# Patient Record
Sex: FEMALE
[De-identification: names, ages, dates, MRNs, and addresses within clinical notes are randomized per-mention and may not be internally consistent; named-entity substitution may affect disease eponyms.]

---

## 2020-12-28 ENCOUNTER — RESULT REVIEW (OUTPATIENT)
Age: 45
End: 2020-12-28

## 2024-08-15 ENCOUNTER — APPOINTMENT (OUTPATIENT)
Dept: BARIATRICS | Facility: CLINIC | Age: 49
End: 2024-08-15
Payer: COMMERCIAL

## 2024-08-15 VITALS
WEIGHT: 293 LBS | HEART RATE: 84 BPM | SYSTOLIC BLOOD PRESSURE: 128 MMHG | HEIGHT: 67.5 IN | TEMPERATURE: 97.5 F | OXYGEN SATURATION: 96 % | DIASTOLIC BLOOD PRESSURE: 86 MMHG | BODY MASS INDEX: 45.45 KG/M2

## 2024-08-15 DIAGNOSIS — R63.8 OTHER SYMPTOMS AND SIGNS CONCERNING FOOD AND FLUID INTAKE: ICD-10-CM

## 2024-08-15 DIAGNOSIS — Z78.9 OTHER SPECIFIED HEALTH STATUS: ICD-10-CM

## 2024-08-15 DIAGNOSIS — Z86.39 PERSONAL HISTORY OF OTHER ENDOCRINE, NUTRITIONAL AND METABOLIC DISEASE: ICD-10-CM

## 2024-08-15 DIAGNOSIS — R79.9 ABNORMAL FINDING OF BLOOD CHEMISTRY, UNSPECIFIED: ICD-10-CM

## 2024-08-15 DIAGNOSIS — R63.2 POLYPHAGIA: ICD-10-CM

## 2024-08-15 DIAGNOSIS — E46 UNSPECIFIED PROTEIN-CALORIE MALNUTRITION: ICD-10-CM

## 2024-08-15 DIAGNOSIS — Z00.00 ENCOUNTER FOR GENERAL ADULT MEDICAL EXAMINATION W/OUT ABNORMAL FINDINGS: ICD-10-CM

## 2024-08-15 DIAGNOSIS — E66.01 MORBID (SEVERE) OBESITY DUE TO EXCESS CALORIES: ICD-10-CM

## 2024-08-15 DIAGNOSIS — Z82.49 FAMILY HISTORY OF ISCHEMIC HEART DISEASE AND OTHER DISEASES OF THE CIRCULATORY SYSTEM: ICD-10-CM

## 2024-08-15 DIAGNOSIS — E56.9 VITAMIN DEFICIENCY, UNSPECIFIED: ICD-10-CM

## 2024-08-15 DIAGNOSIS — Z01.812 ENCOUNTER FOR PREPROCEDURAL LABORATORY EXAMINATION: ICD-10-CM

## 2024-08-15 DIAGNOSIS — R63.5 ABNORMAL WEIGHT GAIN: ICD-10-CM

## 2024-08-15 DIAGNOSIS — E61.8 DEFICIENCY OF OTHER SPECIFIED NUTRIENT ELEMENTS: ICD-10-CM

## 2024-08-15 DIAGNOSIS — N80.9 ENDOMETRIOSIS, UNSPECIFIED: ICD-10-CM

## 2024-08-15 DIAGNOSIS — R06.83 SNORING: ICD-10-CM

## 2024-08-15 PROCEDURE — 99205 OFFICE O/P NEW HI 60 MIN: CPT

## 2024-08-19 PROBLEM — Z86.39 PERSONAL HISTORY OF OTHER ENDOCRINE, NUTRITIONAL AND METABOLIC DISEASE: Status: ACTIVE | Noted: 2024-08-15

## 2024-08-19 PROBLEM — N80.9 ENDOMETRIOSIS: Status: ACTIVE | Noted: 2024-08-15

## 2024-08-19 PROBLEM — R63.5 EXCESSIVE WEIGHT GAIN: Status: ACTIVE | Noted: 2024-08-15

## 2024-08-19 PROBLEM — E66.01 MORBID OBESITY: Status: ACTIVE | Noted: 2024-08-15

## 2024-08-19 PROBLEM — R63.8 DIFFICULTY MAINTAINING WEIGHT: Status: ACTIVE | Noted: 2024-08-15

## 2024-08-19 PROBLEM — Z78.9 SOCIAL ALCOHOL USE: Status: ACTIVE | Noted: 2024-08-15

## 2024-08-19 PROBLEM — R63.2 CALORIE OVERLOAD: Status: ACTIVE | Noted: 2024-08-15

## 2024-08-19 PROBLEM — Z82.49 FAMILY HISTORY OF CARDIAC DISORDER: Status: ACTIVE | Noted: 2024-08-15

## 2024-08-19 PROBLEM — Z78.9 NON-SMOKER: Status: ACTIVE | Noted: 2024-08-15

## 2024-08-19 PROBLEM — E46 SUBOPTIMAL NUTRITION: Status: ACTIVE | Noted: 2024-08-15

## 2024-08-19 RX ORDER — CALCIUM CARB/VITAMIN D3/VIT K1 500-500-40
TABLET,CHEWABLE ORAL
Refills: 0 | Status: ACTIVE | COMMUNITY

## 2024-08-19 RX ORDER — AA/PROT/LYSINE/METHIO/VIT C/B6 50-12.5 MG
TABLET ORAL
Refills: 0 | Status: ACTIVE | COMMUNITY

## 2024-08-19 RX ORDER — CHOLECALCIFEROL (VITAMIN D3) 10(400)/ML
DROPS ORAL
Refills: 0 | Status: ACTIVE | COMMUNITY

## 2024-08-19 RX ORDER — EUCALYPTOL, MENTHOL, METHYL SALICYLATE, THYMOL .92; .42; .6; .64 MG/ML; MG/ML; MG/ML; MG/ML
MOUTHWASH ORAL
Refills: 0 | Status: ACTIVE | COMMUNITY

## 2024-08-19 RX ORDER — CYANOCOBALAMIN (VITAMIN B-12) 3000MCG/ML
DROPS SUBLINGUAL
Refills: 0 | Status: ACTIVE | COMMUNITY

## 2024-08-19 NOTE — HISTORY OF PRESENT ILLNESS
[de-identified] : TRINI TRACY is a 49-year-old F with a long-standing Hx of obesity who presents today for initial evaluation for weight management options.  Interested in weight loss medications.   Heaviest/current wt 307 lbs/300 lbs, lowest wt 180 lbs. Goal weight: 190 lbs Diets/exercise programs tried in the past: yes (i.e. WW, calorie-counting) Weight loss medications tried in the past: no   PMH and FH of: pancreatitis: no gallstones: no kidney stones: no MEN syndrome: no Medullary thyroid cancer: no Anxiety: no Glaucoma: no Ophthalmological Contraindications: no Currently taking narcotic pain medication(s) or suboxone: no   History of bariatric surgery: no Obesity comorbidities: none Anti-obesity medication: none   Current dietary lifestyle: during the work week pt skips breakfast and lunch Breakfast: skips Lunch: bagel; salad; OR skips Dinner: pasta; steak; pizza Snacks: 1-2x/day: chips, cookies Drinks: water (>64 oz/day), coffee with oat milk (<40 oz/day)   Activity Lifestyle: Sleep: 5-8 hrs (reports snoring) Physical activity/exercise: daily activity Work: school counselor Smoking/EtOH: nonsmoker; occasional EtOH use   Females of Childbearing Age: Plans for future pregnancy: no Form(s) of Contraception: bilateral salpingectomy Currently breastfeeding: no   Last Pap Smear: 2022

## 2024-08-19 NOTE — REVIEW OF SYSTEMS
[Patient Intake Form Reviewed] : Patient intake form was reviewed [Fatigue] : fatigue [Negative] : Allergic/Immunologic [Fever] : no fever [Chills] : no chills [Night Sweats] : no night sweats [Muscle Pain] : no muscle pain [Muscle Weakness] : no muscle weakness [FreeTextEntry9] : joint swelling

## 2024-08-19 NOTE — ASSESSMENT
[FreeTextEntry1] : TRINI TRACY is a 49 year-old F with a long-standing h/o obesity, who presents today for initial evaluation for weight management options.   Had a lengthy discussion with the patient regarding nutrition, exercise, weight loss medications, and bariatric surgery. Discussed how bariatric surgery may offer greater weight loss results with better long-term success. The patient qualifies for and is most interested in weight loss medications.  Patient is not interested in surgery at this time.   Health maintenance and behavioral/nutrition counseling for obesity: An additional 30 minutes of the visit was spent counseling the patient regarding need for aggressive weight loss and behavior modification including nutrition and proper eating habits, including which foods to eat and avoid.   Emphasized the importance of making healthier food choices including fresh fruits and vegetables, lean meats, and protein sources. Recommended front loading calories, incorporating whole grains, and eliminating fast foods. I also discussed the importance of avoiding fried/fatty foods and foods containing high sugar content including juices/shakes/sodas/desserts.   Also encouraged beginning an exercise program and recommended cardiovascular exercises along with strength training to build lean muscle. Made suggestions on different types of exercises to try.   Patient will work on the following:  - Eliminate after dinner snacking (consider drinking water instead; can add fruit like Watermelon, Cucumber,    Lemon to water)  -Increase water consumption; aim for 64 oz water/day, and an additional 2 cups of water for every cup of    caffeinated beverage   -Focus on eating 3 well-balanced meals with lean protein and fiber during the daytime with appropriate portion size  -Frontload calories (i.e. avoid skipping meals)  -Cooking fresh meals rather than take out/processed/ready-made foods  -Incorporating exercise (aim for 150 mins/week with both cardio- and strength-training)   Start GLP-1 agonist based on authorization and labs (ordered). Currently there are no contraindications for the use of a GLP-1 agonist after reviewing the pt's medical history and labs, including personal and family history of thyroid cancer or MEN 2 Syndrome. Possible side effects of GLP-1 agonist were discussed with the patient, including nausea, abdominal pain, reflux, constipation, delayed gastric emptying, gallstones, kidney stones, visual changes, and pancreatitis. Pt verbalizes understanding and wishes to proceed with medical therapy. Instructions for use provided. Pt understands the need for long-term use and understands the risk of weight-gain upon stopping weight loss medications.   Patient educated to call with questions/concerns All questions answered Return to office 3 weeks after starting GLP-1 agonist; call the office right away with any side effects   Discussed with the pt of the warnings of pregnancy while on a GLP-1 agonist:  - instructed pt to avoid planned pregnancy and use contraception   - advised pt to stop GLP-1 agonist immediately if becomes pregnant   Pt verbalized understanding of the above, patient aware that GLP-1's may not be covered by insurance.   Additional time spent before and after visit reviewing chart.

## 2024-08-19 NOTE — HISTORY OF PRESENT ILLNESS
[de-identified] : TRINI TRACY is a 49-year-old F with a long-standing Hx of obesity who presents today for initial evaluation for weight management options.  Interested in weight loss medications.   Heaviest/current wt 307 lbs/300 lbs, lowest wt 180 lbs. Goal weight: 190 lbs Diets/exercise programs tried in the past: yes (i.e. WW, calorie-counting) Weight loss medications tried in the past: no   PMH and FH of: pancreatitis: no gallstones: no kidney stones: no MEN syndrome: no Medullary thyroid cancer: no Anxiety: no Glaucoma: no Ophthalmological Contraindications: no Currently taking narcotic pain medication(s) or suboxone: no   History of bariatric surgery: no Obesity comorbidities: none Anti-obesity medication: none   Current dietary lifestyle: during the work week pt skips breakfast and lunch Breakfast: skips Lunch: bagel; salad; OR skips Dinner: pasta; steak; pizza Snacks: 1-2x/day: chips, cookies Drinks: water (>64 oz/day), coffee with oat milk (<40 oz/day)   Activity Lifestyle: Sleep: 5-8 hrs (reports snoring) Physical activity/exercise: daily activity Work: school counselor Smoking/EtOH: nonsmoker; occasional EtOH use   Females of Childbearing Age: Plans for future pregnancy: no Form(s) of Contraception: bilateral salpingectomy Currently breastfeeding: no   Last Pap Smear: 2022

## 2024-08-19 NOTE — PHYSICAL EXAM
[Obese, well nourished, in no acute distress] : obese, well nourished, in no acute distress [Normal] : affect appropriate [de-identified] : soft, NT, ND, no evidence of hernia or diastasis; obese

## 2024-08-19 NOTE — PHYSICAL EXAM
[Obese, well nourished, in no acute distress] : obese, well nourished, in no acute distress [Normal] : affect appropriate [de-identified] : soft, NT, ND, no evidence of hernia or diastasis; obese

## 2024-10-29 ENCOUNTER — APPOINTMENT (OUTPATIENT)
Dept: BARIATRICS/WEIGHT MGMT | Facility: CLINIC | Age: 49
End: 2024-10-29